# Patient Record
Sex: FEMALE | Race: WHITE | Employment: FULL TIME | URBAN - METROPOLITAN AREA
[De-identification: names, ages, dates, MRNs, and addresses within clinical notes are randomized per-mention and may not be internally consistent; named-entity substitution may affect disease eponyms.]

---

## 2019-12-15 ENCOUNTER — HOSPITAL ENCOUNTER (EMERGENCY)
Facility: CLINIC | Age: 34
Discharge: HOME | End: 2019-12-15
Attending: EMERGENCY MEDICINE | Admitting: EMERGENCY MEDICINE
Payer: COMMERCIAL

## 2019-12-15 VITALS
SYSTOLIC BLOOD PRESSURE: 130 MMHG | RESPIRATION RATE: 18 BRPM | TEMPERATURE: 98 F | OXYGEN SATURATION: 100 % | DIASTOLIC BLOOD PRESSURE: 81 MMHG | HEART RATE: 73 BPM | BODY MASS INDEX: 51.91 KG/M2 | WEIGHT: 293 LBS | HEIGHT: 63 IN

## 2019-12-15 DIAGNOSIS — R10.13 ABDOMINAL PAIN, EPIGASTRIC: ICD-10-CM

## 2019-12-15 PROCEDURE — 99284 EMERGENCY DEPT VISIT MOD MDM: CPT

## 2019-12-15 PROCEDURE — 25000125 ZZHC RX 250: Performed by: EMERGENCY MEDICINE

## 2019-12-15 PROCEDURE — 99284 EMERGENCY DEPT VISIT MOD MDM: CPT | Mod: Z6 | Performed by: EMERGENCY MEDICINE

## 2019-12-15 PROCEDURE — 25000132 ZZH RX MED GY IP 250 OP 250 PS 637: Performed by: EMERGENCY MEDICINE

## 2019-12-15 RX ORDER — FAMOTIDINE 20 MG/1
20 TABLET, FILM COATED ORAL 2 TIMES DAILY
Qty: 28 TABLET | Refills: 0 | COMMUNITY
Start: 2019-12-15 | End: 2019-12-29

## 2019-12-15 RX ADMIN — LIDOCAINE HYDROCHLORIDE 30 ML: 20 SOLUTION ORAL; TOPICAL at 02:27

## 2019-12-15 ASSESSMENT — ENCOUNTER SYMPTOMS
ABDOMINAL PAIN: 1
ABDOMINAL DISTENTION: 1
NAUSEA: 0
FLANK PAIN: 0
LIGHT-HEADEDNESS: 0
SHORTNESS OF BREATH: 0
VOMITING: 0
BACK PAIN: 0
CHEST TIGHTNESS: 0
COUGH: 0
FREQUENCY: 0
DYSURIA: 0
FEVER: 0
FATIGUE: 0
DIARRHEA: 0
CONSTIPATION: 0
CHILLS: 0
APPETITE CHANGE: 0

## 2019-12-15 ASSESSMENT — MIFFLIN-ST. JEOR: SCORE: 2039.92

## 2019-12-15 NOTE — LETTER
December 15, 2019      To Whom It May Concern:      Thalia Fowler was seen in our Emergency Department today, 12/15/19.  Please excuse her from work today.    Sincerely,        Anjum Jerry MD

## 2019-12-15 NOTE — ED PROVIDER NOTES
"  History     Chief Complaint   Patient presents with     Abdominal Pain     feels like \"she ran a marathon and it hurts to breathe\" and diffuse upper abdominal pain, started a few hours ago, happened early last week and it went away after a few hours, taken antacids, has vomitted 3 times in the last hour.      HPI  Thalia Fowler is a 34 year old female with history of obesity presented for evaluation of diffuse epigastric abdominal pain.  Patient reports 2 similar episodes this month and will previous episode a few months ago.  Patient is uncertain if symptoms are attributable to food.  She reports sharp pain tonight in her epigastrium with no significant nausea.  No diarrhea.  Has had normal appetite recently.  Tonight she has had multiple episodes of severe sharp pain.  No reported constipation and has had a normal regular bowel movement.  No fevers or chills.  No injury.  Tried some Tums without significant relief.  Denies history of abdominal surgery.  No history of gallbladder problems.    Allergies:  No Known Allergies    Problem List:    There are no active problems to display for this patient.       Past Medical History:    No past medical history on file.    Past Surgical History:    No past surgical history on file.    Family History:    No family history on file.    Social History:  Marital Status:  Single [1]  Social History     Tobacco Use     Smoking status: Not on file   Substance Use Topics     Alcohol use: Not on file     Drug use: Not on file        Medications:    famotidine (PEPCID) 20 MG tablet          Review of Systems   Constitutional: Negative for appetite change, chills, fatigue and fever.   Respiratory: Negative for cough, chest tightness and shortness of breath.    Cardiovascular: Negative for chest pain.   Gastrointestinal: Positive for abdominal distention and abdominal pain. Negative for constipation, diarrhea, nausea and vomiting.   Genitourinary: Negative for dysuria, flank pain, " "frequency and vaginal bleeding.   Musculoskeletal: Negative for back pain.   Skin: Negative for rash.   Neurological: Negative for light-headedness.   All other systems reviewed and are negative.      Physical Exam   BP: 130/81  Pulse: 73  Temp: 98  F (36.7  C)  Resp: 18  Height: 160 cm (5' 3\")  Weight: 136.1 kg (300 lb)  SpO2: 100 %      Physical Exam  Vitals signs and nursing note reviewed.   Constitutional:       Appearance: She is obese. She is not ill-appearing.   HENT:      Head: Normocephalic and atraumatic.      Mouth/Throat:      Mouth: Mucous membranes are moist.   Cardiovascular:      Rate and Rhythm: Normal rate.   Pulmonary:      Effort: Pulmonary effort is normal.   Abdominal:      General: Bowel sounds are normal. There is no distension. There are no signs of injury.      Palpations: Abdomen is soft.      Tenderness: There is abdominal tenderness in the epigastric area.   Skin:     General: Skin is warm and dry.      Capillary Refill: Capillary refill takes less than 2 seconds.   Neurological:      Mental Status: She is alert and oriented to person, place, and time.   Psychiatric:         Mood and Affect: Mood normal.         ED Course        Procedures                   No results found for this or any previous visit (from the past 24 hour(s)).    Medications   lidocaine (XYLOCAINE) 2 % 15 mL, alum & mag hydroxide-simethicone (MYLANTA ES/MAALOX  ES) 15 mL GI Cocktail (30 mLs Oral Given 12/15/19 0227)     3:00 AM Patient re-assessed: Patient sitting upright at the edge of the bed and no distress.  Reports symptoms are almost entirely gone after the GI cocktail.  Patient would like to go home.    Assessments & Plan (with Medical Decision Making)  Well-appearing 34-year-old female presenting for evaluation of episodic epigastric pain tonight and with few episodes recently.  No clear preceding cause.  No history of constipation.  Well-appearing in the ED with minimal tenderness.  Patient symptoms " spontaneously improving upon arrival in the ED and already much improved at the time of my evaluation.  Patient not acutely ill-appearing.  Discussed risk and benefits of further work-up here.  Patient preferred to decline further invasive work-up such as blood work or imaging but did agree to a trial of a GI cocktail which did dramatically improve her symptoms and nearly resolved.  Exact cause of symptoms unclear, differentials include gastritis, peptic ulcer disease, biliary colic.  Recommended a trial of antacid therapy and primary care follow-up to discuss further work-up such as gallbladder ultrasound as an outpatient.  Return precautions given if symptoms worsen.     I have reviewed the nursing notes.    I have reviewed the findings, diagnosis, plan and need for follow up with the patient.       Discharge Medication List as of 12/15/2019  3:05 AM      START taking these medications    Details   famotidine (PEPCID) 20 MG tablet Take 1 tablet (20 mg) by mouth 2 times daily for 14 days, Disp-28 tablet, R-0, OTC             Final diagnoses:   Abdominal pain, epigastric - possible gastritis       12/15/2019   [facility] EMERGENCY DEPARTMENT     Jerry, Anjum Ferraro MD  12/15/19 0433

## 2019-12-15 NOTE — ED AVS SNAPSHOT
[facility] Emergency Department  [address] [address]  Phone:  [phone]  Fax:  [phone]                                    Thalia Fowler   MRN: [id number]    Department:  [facility] Emergency Department   Date of Visit:  12/15/2019           After Visit Summary Signature Page    I have received my discharge instructions, and my questions have been answered. I have discussed any challenges I see with this plan with the nurse or doctor.    ..........................................................................................................................................  Patient/Patient Representative Signature      ..........................................................................................................................................  Patient Representative Print Name and Relationship to Patient    ..................................................               ................................................  Date                                   Time    ..........................................................................................................................................  Reviewed by Signature/Title    ...................................................              ..............................................  Date                                               Time          22EPIC Rev 08/18

## 2019-12-15 NOTE — ED TRIAGE NOTES
"Pt arrives with multiple complaints, diffuse upper abdominal pain with vomiting three times in the last hour, states \"feels like I ran a marathon its hard to breathe\" last BM at noon states it was normal.   "